# Patient Record
Sex: MALE | Race: WHITE | Employment: UNEMPLOYED | URBAN - METROPOLITAN AREA
[De-identification: names, ages, dates, MRNs, and addresses within clinical notes are randomized per-mention and may not be internally consistent; named-entity substitution may affect disease eponyms.]

---

## 2022-06-20 ENCOUNTER — APPOINTMENT (OUTPATIENT)
Dept: GENERAL RADIOLOGY | Age: 77
End: 2022-06-20
Payer: MEDICARE

## 2022-06-20 ENCOUNTER — HOSPITAL ENCOUNTER (EMERGENCY)
Age: 77
Discharge: HOME | End: 2022-06-20
Attending: EMERGENCY MEDICINE
Payer: MEDICARE

## 2022-06-20 VITALS
HEIGHT: 76 IN | SYSTOLIC BLOOD PRESSURE: 127 MMHG | WEIGHT: 239.9 LBS | TEMPERATURE: 97.9 F | OXYGEN SATURATION: 100 % | RESPIRATION RATE: 13 BRPM | DIASTOLIC BLOOD PRESSURE: 80 MMHG | HEART RATE: 99 BPM | BODY MASS INDEX: 29.21 KG/M2

## 2022-06-20 DIAGNOSIS — I48.92 NEW ONSET ATRIAL FLUTTER (HCC): ICD-10-CM

## 2022-06-20 DIAGNOSIS — I48.92 ATRIAL FLUTTER WITH RAPID VENTRICULAR RESPONSE (HCC): Primary | ICD-10-CM

## 2022-06-20 LAB
A/G RATIO: 1.6 (ref 1.1–2.2)
ALBUMIN SERPL-MCNC: 4.2 G/DL (ref 3.4–5)
ALP BLD-CCNC: 73 U/L (ref 40–129)
ALT SERPL-CCNC: 11 U/L (ref 10–40)
ANION GAP SERPL CALCULATED.3IONS-SCNC: 14 MMOL/L (ref 3–16)
AST SERPL-CCNC: 17 U/L (ref 15–37)
BASOPHILS ABSOLUTE: 0.1 K/UL (ref 0–0.2)
BASOPHILS RELATIVE PERCENT: 1.1 %
BILIRUB SERPL-MCNC: 0.6 MG/DL (ref 0–1)
BUN BLDV-MCNC: 12 MG/DL (ref 7–20)
CALCIUM SERPL-MCNC: 9.3 MG/DL (ref 8.3–10.6)
CHLORIDE BLD-SCNC: 109 MMOL/L (ref 99–110)
CO2: 19 MMOL/L (ref 21–32)
CREAT SERPL-MCNC: 1.1 MG/DL (ref 0.8–1.3)
EKG ATRIAL RATE: 267 BPM
EKG DIAGNOSIS: NORMAL
EKG P AXIS: 247 DEGREES
EKG Q-T INTERVAL: 280 MS
EKG QRS DURATION: 92 MS
EKG QTC CALCULATION (BAZETT): 380 MS
EKG R AXIS: -72 DEGREES
EKG T AXIS: 63 DEGREES
EKG VENTRICULAR RATE: 111 BPM
EOSINOPHILS ABSOLUTE: 0.1 K/UL (ref 0–0.6)
EOSINOPHILS RELATIVE PERCENT: 1.3 %
GFR AFRICAN AMERICAN: >60
GFR NON-AFRICAN AMERICAN: >60
GLUCOSE BLD-MCNC: 115 MG/DL (ref 70–99)
HCT VFR BLD CALC: 47 % (ref 40.5–52.5)
HEMOGLOBIN: 16.2 G/DL (ref 13.5–17.5)
LYMPHOCYTES ABSOLUTE: 1.7 K/UL (ref 1–5.1)
LYMPHOCYTES RELATIVE PERCENT: 26.9 %
MCH RBC QN AUTO: 32.9 PG (ref 26–34)
MCHC RBC AUTO-ENTMCNC: 34.4 G/DL (ref 31–36)
MCV RBC AUTO: 95.6 FL (ref 80–100)
MONOCYTES ABSOLUTE: 0.5 K/UL (ref 0–1.3)
MONOCYTES RELATIVE PERCENT: 8.6 %
NEUTROPHILS ABSOLUTE: 3.9 K/UL (ref 1.7–7.7)
NEUTROPHILS RELATIVE PERCENT: 62.1 %
PDW BLD-RTO: 13 % (ref 12.4–15.4)
PLATELET # BLD: 226 K/UL (ref 135–450)
PMV BLD AUTO: 8.9 FL (ref 5–10.5)
POTASSIUM REFLEX MAGNESIUM: 4 MMOL/L (ref 3.5–5.1)
PRO-BNP: 933 PG/ML (ref 0–449)
RBC # BLD: 4.91 M/UL (ref 4.2–5.9)
SODIUM BLD-SCNC: 142 MMOL/L (ref 136–145)
TOTAL PROTEIN: 6.8 G/DL (ref 6.4–8.2)
TROPONIN: <0.01 NG/ML
TSH REFLEX: 2.58 UIU/ML (ref 0.27–4.2)
WBC # BLD: 6.3 K/UL (ref 4–11)

## 2022-06-20 PROCEDURE — 96374 THER/PROPH/DIAG INJ IV PUSH: CPT

## 2022-06-20 PROCEDURE — 84484 ASSAY OF TROPONIN QUANT: CPT

## 2022-06-20 PROCEDURE — 80053 COMPREHEN METABOLIC PANEL: CPT

## 2022-06-20 PROCEDURE — 6370000000 HC RX 637 (ALT 250 FOR IP): Performed by: PHYSICIAN ASSISTANT

## 2022-06-20 PROCEDURE — 96361 HYDRATE IV INFUSION ADD-ON: CPT

## 2022-06-20 PROCEDURE — 2500000003 HC RX 250 WO HCPCS: Performed by: PHYSICIAN ASSISTANT

## 2022-06-20 PROCEDURE — 93010 ELECTROCARDIOGRAM REPORT: CPT | Performed by: INTERNAL MEDICINE

## 2022-06-20 PROCEDURE — 83880 ASSAY OF NATRIURETIC PEPTIDE: CPT

## 2022-06-20 PROCEDURE — 71045 X-RAY EXAM CHEST 1 VIEW: CPT

## 2022-06-20 PROCEDURE — 84443 ASSAY THYROID STIM HORMONE: CPT

## 2022-06-20 PROCEDURE — 93005 ELECTROCARDIOGRAM TRACING: CPT | Performed by: EMERGENCY MEDICINE

## 2022-06-20 PROCEDURE — 85025 COMPLETE CBC W/AUTO DIFF WBC: CPT

## 2022-06-20 PROCEDURE — 2580000003 HC RX 258: Performed by: PHYSICIAN ASSISTANT

## 2022-06-20 PROCEDURE — 93005 ELECTROCARDIOGRAM TRACING: CPT | Performed by: PHYSICIAN ASSISTANT

## 2022-06-20 PROCEDURE — 99285 EMERGENCY DEPT VISIT HI MDM: CPT

## 2022-06-20 RX ORDER — METOPROLOL TARTRATE 5 MG/5ML
5 INJECTION INTRAVENOUS
Status: COMPLETED | OUTPATIENT
Start: 2022-06-20 | End: 2022-06-20

## 2022-06-20 RX ORDER — 0.9 % SODIUM CHLORIDE 0.9 %
500 INTRAVENOUS SOLUTION INTRAVENOUS ONCE
Status: COMPLETED | OUTPATIENT
Start: 2022-06-20 | End: 2022-06-20

## 2022-06-20 RX ADMIN — METOPROLOL TARTRATE 5 MG: 5 INJECTION, SOLUTION INTRAVENOUS at 11:41

## 2022-06-20 RX ADMIN — METOPROLOL TARTRATE 5 MG: 5 INJECTION, SOLUTION INTRAVENOUS at 11:33

## 2022-06-20 RX ADMIN — METOPROLOL TARTRATE 5 MG: 5 INJECTION, SOLUTION INTRAVENOUS at 11:45

## 2022-06-20 RX ADMIN — SODIUM CHLORIDE 500 ML: 9 INJECTION, SOLUTION INTRAVENOUS at 10:54

## 2022-06-20 RX ADMIN — METOPROLOL TARTRATE 25 MG: 25 TABLET, FILM COATED ORAL at 13:00

## 2022-06-20 ASSESSMENT — PAIN - FUNCTIONAL ASSESSMENT
PAIN_FUNCTIONAL_ASSESSMENT: ACTIVITIES ARE NOT PREVENTED
PAIN_FUNCTIONAL_ASSESSMENT: 0-10
PAIN_FUNCTIONAL_ASSESSMENT: NONE - DENIES PAIN
PAIN_FUNCTIONAL_ASSESSMENT: ACTIVITIES ARE NOT PREVENTED

## 2022-06-20 ASSESSMENT — PAIN DESCRIPTION - LOCATION: LOCATION: GENERALIZED

## 2022-06-20 ASSESSMENT — PAIN DESCRIPTION - DESCRIPTORS: DESCRIPTORS: ACHING

## 2022-06-20 ASSESSMENT — PAIN DESCRIPTION - FREQUENCY: FREQUENCY: CONTINUOUS

## 2022-06-20 ASSESSMENT — PAIN SCALES - GENERAL: PAINLEVEL_OUTOF10: 2

## 2022-06-20 ASSESSMENT — PAIN DESCRIPTION - ONSET: ONSET: ON-GOING

## 2022-06-20 NOTE — ED PROVIDER NOTES
Date of evaluation: 6/20/2022    ED Attending Attestation Note     CHIEF COMPLAINT     This morning I got up to go to the bathroom and shower and when I got out my body locked up and my heart felt like it was beating really fast and I was able to crawl to my bed and by the time my son got there I was feeling a lot better   HISTORY OF PRESENT ILLNESS  (Location/Symptom, Timing/Onset,Context/Setting, Quality, Duration, Modifying Factors, Severity). Note limiting factors. This patient was seen by the advance practice provider. I have seen and examined the patient, agree with the workup, evaluation, management and diagnosis. The care plan has been discussed. Chief Complaint   Patient presents with    Loss of Consciousness     pre syncopal episode this am while in the bathroom. pt states he had body aches last night.  body aches this am.  pt wtih chest pain one hr ago. denies diaphoresis denies nausea        Donaldo Aguila is a 69 y.o. male who presents to the emergency department secondary to concern for feeling his body locked up earlier today associated with palpitations. He did not actually lose consciousness and stated he did not exactly feel like he was going to pass out but rather that he could not move. He stated after laying down in bed he felt significantly better and he is still feeling significantly better. He has no known history of cardiac arrhythmia or cardiac issue. He is not on blood thinners. He is not on any antihypertensive medication. He follows with his primary care twice a year. He denies any chest pain, trouble breathing, nausea, vomiting. He does have chronic pain from back issues though reports he has weaned himself off of tramadol and is down from 6 pills of gabapentin to 2 pills daily for history of neuropathy. No recent illness like symptoms. Past medical history noted below:    has a past medical history of Arthritis, Cancer ([address] and Kidney stones.    Social History Socioeconomic History    Marital status:      Spouse name: Not on file    Number of children: Not on file    Years of education: Not on file    Highest education level: Not on file   Occupational History    Not on file   Tobacco Use    Smoking status: Never Smoker    Smokeless tobacco: Not on file   Substance and Sexual Activity    Alcohol use: Yes     Comment: couple beers a day    Drug use: Not on file    Sexual activity: Not on file   Other Topics Concern    Not on file   Social History Narrative    Not on file     Social Determinants of Health     Financial Resource Strain:     Difficulty of Paying Living Expenses: Not on file   Food Insecurity:     Worried About Running Out of Food in the Last Year: Not on file    Marcia of Food in the Last Year: Not on file   Transportation Needs:     Lack of Transportation (Medical): Not on file    Lack of Transportation (Non-Medical): Not on file   Physical Activity:     Days of Exercise per Week: Not on file    Minutes of Exercise per Session: Not on file   Stress:     Feeling of Stress : Not on file   Social Connections:     Frequency of Communication with Friends and Family: Not on file    Frequency of Social Gatherings with Friends and Family: Not on file    Attends Mosque Services: Not on file    Active Member of [address] [place] [address] [employer] or Organizations: Not on file    Attends Club or Organization Meetings: Not on file    Marital Status: Not on file   Intimate Partner Violence:     Fear of Current or Ex-Partner: Not on file    Emotionally Abused: Not on file    Physically Abused: Not on file    Sexually Abused: Not on file   Housing Stability:     Unable to Pay for Housing in the Last Year: Not on file    Number of [place] in the Last Year: Not on file    Unstable Housing in the Last Year: Not on file     Aside from what is stated above denies any other symptoms or modifying factors. Nursing Notes reviewed.     Past Surgical History: Procedure Laterality Date    BACK SURGERY      COLONOSCOPY  6-16-16    Dr Colette Ramirez       No family history on file. CURRENT MEDICATIONS       Previous Medications    ASPIRIN 81 MG TABLET    Take 81 mg by mouth daily    CETIRIZINE (ZYRTEC) 10 MG TABLET    Take 10 mg by mouth daily    MELOXICAM (MOBIC) 15 MG TABLET    Take 15 mg by mouth daily    TRAMADOL (ULTRAM) 50 MG TABLET    Take 50 mg by mouth every 6 hours as needed for Pain      DIAGNOSTIC RESULTS   EKG: interpreted by the Emergency Department Physician who either signs or Co-signs this chart in the absence of a cardiologist.  EKG Indication: Tachycardia  EKG Interpretation: Rate tachycardic 111, rhythm appears most consistent with atrial flutter, axis left. QRS/QTc within normal limits. No prior EKG is available for comparison. RADIOLOGY:   Interpretation per Radiologist below, if available at the time of this note:  XR CHEST PORTABLE   Final Result   No radiographic evidence of acute pulmonary disease.            Patient was given the following medications:  Orders Placed This Encounter   Medications    0.9 % sodium chloride bolus    metoprolol (LOPRESSOR) injection 5 mg    metoprolol tartrate (LOPRESSOR) tablet 25 mg    apixaban (ELIQUIS) 5 mg tablets (ED 8 day apixaban AFIB pack)     Order Specific Question:   Indication of Use     Answer:   A Fib/A Flutter    metoprolol tartrate (LOPRESSOR) 25 MG tablet     Sig: Take 1 tablet by mouth 2 times daily     Dispense:  60 tablet     Refill:  0    apixaban (ELIQUIS) 5 MG TABS tablet     Sig: Take 1 tablet by mouth 2 times daily     Dispense:  60 tablet     Refill:  0       INITIAL VITALS: BP: (!) 164/93, Temp: 97.9 °F (36.6 °C), Heart Rate: (!) 110, Resp: 18, SpO2: 98 %   RECENT VITALS:  BP: 127/80,Temp: 97.9 °F (36.6 °C), Heart Rate: 99, Resp: 13, SpO2: 100 %     I personally saw the patient and performed a substantive portion of the visit including all aspects of the medical decision making. Is this patient to be included in the SEP-1 Core Measure due to severe sepsis or septic shock? No   Exclusion criteria - the patient is NOT to be included for SEP-1 Core Measure due to:  2+ SIRS criteria are not met      My assessment reveals a very pleasant male laying in bed with his wife at the bedside. She did trade out with their son who is an internist at [facility] [facility] [employer].  On my assessment his lungs are clear, no increased work of breathing, abdomen benign, no peripheral edema. He answers questions appropriately and in complete sentences. We went over the potential etiologies of cardiac arrhythmias and plan for work-up here. In the interim he also had ordered metoprolol IV 5 mg every 5 minutes x3 doses for heart rate greater than 110. CHADS-VASC 2. On reassessment his heart rate had improved. We discussed his elevated [id number] and recommendation for starting anticoagulation. Also discussed starting p.o. metoprolol, received first dose here along with first dose and take home package) of eliquis. Also discussed our new protocols here with urgent follow-up outpatient. He is feeling significantly better at that time. Was set up for discharge, just prior to discharge his heart rate jumped back up to the 130s and a repeat EKG was done which came back under 110 without any other changes. On reassessment he continued to feel well. He had gotten up and use the restroom had no new symptoms. Both he and his son at the bedside were in agreement still for discharge with plan for outpatient follow-up. They expressed understanding of return precautions. They expressed understanding of instructions, were in agreement with plan, and he was discharged home with his son in stable condition. FINAL IMPRESSION      1. Atrial flutter with rapid ventricular response ([facility] [address]    2.  New onset atrial flutter [facility])        DISPOSITION/PLAN   DISPOSITION Decision To Discharge 06/20/2022 01:31:32 PM      PATIENT REFERRED TO:  [facility] Emergency Department  [address] [facility]  [phone]  Go to   If symptoms worsen    [facility] HEART INST [address] [place]  Call   For follow up appointment    [address] [place]  Call   For follow up and evaluation as oupatient this week.       DISCHARGE MEDICATIONS:  New Prescriptions    APIXABAN (ELIQUIS) 5 MG TABS TABLET    Take 1 tablet by mouth 2 times daily    METOPROLOL TARTRATE (LOPRESSOR) 25 MG TABLET    Take 1 tablet by mouth 2 times daily            (Please note that portions of this note were completed with a voice recognition program. Efforts were made to edit the dictations but occasionally words are mis-transcribed.)    Marian Hester MD (electronically signed)  Attending Emergency Physician        Marian Hester MD  06/20/22 4212

## 2022-06-20 NOTE — ED PROVIDER NOTES
[address]  [address] [address]  Dept: [phone]  Loc: [phone]  eMERGENCYdEPARTMENT eNCOUnter      Pt Name: Jorge Alberto Estrada  MRN: [id number]  [place] 1945  Date of evaluation: 6/20/2022  Provider:Connie Wolf PA-C    CHIEF COMPLAINT       Chief Complaint   Patient presents with    Loss of Consciousness     pre syncopal episode this am while in the bathroom. pt states he had body aches last night.  body aches this am.  pt wtih chest pain one hr ago. denies diaphoresis denies nausea       CRITICAL CARE TIME   Total Critical Care time was 15 minutes, excluding separately reportable procedures. There was a high probability of clinically significant/life threatening deterioration in the patient's condition which required my urgentintervention. HISTORY OF PRESENT ILLNESS  (Location/Symptom, Timing/Onset, Context/Setting, Quality, Duration,Modifying Factors, Severity.)   Jorge Alberto Estrada is a 69 y.o. male with a PMH of HDL, alzheimers dx, RLS, BPH who presents to the emergency department by private vehicle following a near syncopal episode. Patient was walking out of the bathroom this am and began feeling lightheaded. He states his body \"locked up\" and then he almost lost consciousness. Episode lasted about 30 seconds. He states he did feel as if his heart was pounding in his chest at that time. He denies any associated sob, nausea, vomiting, diaphoresis, headache, chest pain, vertigo sx. States he has had occasional episodes when he get light headed upon standing over the past year. These episodes are brief and rare. Never has had episode like that he experienced this am.  Patient's son who is an internist at outside hospital and lives nearby was called over. He noted his father's heart rate was elevated and appeared irregular. Patient with no history of A. fib.  Given this and symptoms he brought him to ED for further evaluation. Nursing Notes were reviewedand agreed with or any disagreements were addressed in the HPI. REVIEW OF SYSTEMS    (2-9 systems for level 4, 10 or more for level 5)     Review of Systems   Constitutional: Negative. HENT: Negative. Eyes: Negative. Respiratory: Negative for chest tightness and shortness of breath. Cardiovascular: Negative for chest pain. Gastrointestinal: Negative for abdominal pain, nausea and vomiting. Genitourinary: Negative. Musculoskeletal: Negative for arthralgias and myalgias. Skin: Negative. Neurological: Positive for light-headedness. Psychiatric/Behavioral: Negative for behavioral problems and confusion. Except as noted above the remainder of the review of systems was reviewed and negative. PAST MEDICAL HISTORY         Diagnosis Date    Arthritis     Cancer ([facility] [address]     basal and squamous cell on back    Kidney stones        SURGICAL HISTORY           Procedure Laterality Date    BACK SURGERY      COLONOSCOPY  6-16-16    Dr Alejandra Lugo     [unfilled]    ALLERGIES     Patient has no known allergies. FAMILY HISTORY     No family history on file. No family status information on file. SOCIAL HISTORY      reports that he has never smoked. He does not have any smokeless tobacco history on file. He reports current alcohol use. PHYSICAL EXAM    (up to 7 for level 4, 8 or more for level 5)     ED Triage Vitals   Enc Vitals Group      BP 06/20/22 1004 (!) 164/93      Heart Rate 06/20/22 1004 (!) 110      Resp 06/20/22 1004 18      Temp 06/20/22 1004 97.9 °F (36.6 °C)      Temp Source 06/20/22 1004 Tympanic      SpO2 --       Weight 06/20/22 1004 239 lb 14.4 oz (108.8 kg)      Height 06/20/22 1024 6' 4\" (1.93 m)      Head Circumference --       Peak Flow --       Pain Score --       Pain Loc --       Pain Edu? --       Excl.  in [address]? --         Physical Exam  Constitutional:       Appearance: Normal appearance. HENT:      Head: Normocephalic and atraumatic. Cardiovascular:      Rate and Rhythm: Tachycardia present. Rhythm irregular. Pulmonary:      Effort: Pulmonary effort is normal. No respiratory distress. Breath sounds: Normal breath sounds. Abdominal:      Palpations: Abdomen is soft. Tenderness: There is no abdominal tenderness. Musculoskeletal:         General: Normal range of motion. Cervical back: Normal range of motion and neck supple. Skin:     General: Skin is warm. Neurological:      General: No focal deficit present. Mental Status: He is alert and oriented to person, place, and time. Comments: No ataxia, negative test of skew   Psychiatric:         Mood and Affect: Mood normal.         Behavior: Behavior normal.           DIAGNOSTIC RESULTS     EKG: All EKG's are interpreted by the Emergency Department Physician who either signs or Co-signs this chart in the absence of a cardiologist.    RADIOLOGY:   Non-plain film images such as CT, Ultrasound and MRI are read by the radiologist. Plain radiographic images are visualized and preliminarilyinterpreted by the emergency physician with the below findings:    Interpretation per the Radiologist below,if available at the time of this note:    XR CHEST PORTABLE   Final Result   No radiographic evidence of acute pulmonary disease.                LABS:  Labs Reviewed   COMPREHENSIVE METABOLIC PANEL W/ REFLEX TO MG FOR LOW K - Abnormal; Notable for the following components:       Result Value    CO2 19 (*)     Glucose 115 (*)     All other components within normal limits   BRAIN NATRIURETIC PEPTIDE - Abnormal; Notable for the following components:    Pro- (*)     All other components within normal limits   CBC WITH AUTO DIFFERENTIAL   TROPONIN   TSH WITH REFLEX   URINALYSIS WITH REFLEX TO CULTURE       All other labs were within normal range or not returned as of this dictation. EMERGENCY DEPARTMENT COURSE and DIFFERENTIAL DIAGNOSIS/MDM:   Vitals:    Vitals:    06/20/22 1300 06/20/22 1330 06/20/22 1400 06/20/22 1412   BP: 111/71 (!) 116/95 127/80    Pulse: 81 (!) 122 97 99   Resp: 13 10 13    Temp:       TempSrc:       SpO2: 96% 100% 100%    Weight:       Height:           MDM     Patient presents ED with HPI noted above. Patient found to have new onset a flutter with RVR. Patient with no history of A. fib/a flutter. Patient with no signs/symptoms concerning for sepsis or infectious process. Heart rate is 110s to 130s on arrival.  He was given 5 mg IV metoprolol every 5 minutes x3 with improvement of heart rate. Heart rate controlled in the 70s and 90s. He was given 25 mg p.o. metoprolol. Just after medications given heart rate did jump back up to the 130s however improved as oral medication took effect. Patient with CHADS-VAS score of 2, anticoagulation initiated. Heart rate increase just prior to discharge to the 120s. Patient was able to get up and walk around at this time. He was asymptomatic and voiced he felt well. Without any further intervention heart rate improved again to the 90s and he was discharged home. Patient and son comfortable plan. Urgent outpatient referral to EP clinic provided. He was discharged home in stable condition. The patient tolerated their visit well. They were seen and evaluated by the attending physician, Dr. Harlan Mckeon who agreed with the assessment and plan. The patient and / or the family were informed of the results of any tests, a time was given to answer questions, a plan was proposed and they agreed with plan.         [id number] Score for Atrial Fibrillation Stroke Risk   Risk   Factors  Component Value   C CHF No 0   H HTN No 0   A2 Age >= 76 Yes,  (78 y.o.) 2   D DM No 0   S2 Prior Stroke/TIA No 0   V Vascular Disease No 0   A Age 74-69 No,  (78 y.o.) 0   Sc Sex male 0    [id number]  Score  2   Score last updated 6/21/22 9:96 AM EDT    Click here for a link to the UpToDate guideline \"Atrial Fibrillation: Anticoagulation therapy to prevent embolization    Disclaimer: Risk Score calculation is dependent on accuracy of patient problem list and past encounter diagnosis. CONSULTS:  None    PROCEDURES:  Procedures    FINAL IMPRESSION      1. Atrial flutter with rapid ventricular response ([address]    2. New onset atrial flutter [facility])          DISPOSITION/PLAN   [unfilled]    PATIENT REFERRED TO:  [facility] Emergency Department  [address] [facility]  [phone]  Go to   If symptoms worsen    [facility] HEART INST [address] [place] [place]  Call   For follow up appointment    [address] [place] [facility]  Call   For follow up and evaluation as oupatient this week.       DISCHARGE MEDICATIONS:  Discharge Medication List as of 6/20/2022  2:55 PM      START taking these medications    Details   metoprolol tartrate (LOPRESSOR) 25 MG tablet Take 1 tablet by mouth 2 times daily, Disp-60 tablet, R-0Normal             (Please note that portions of this note were completed with a voice recognition program.  Efforts were made to edit the dictations but occasionally words are mis-transcribed.)    [address] [facility]          [address] [place], [place]  06/21/22 2105

## 2022-06-21 LAB
EKG DIAGNOSIS: NORMAL
EKG Q-T INTERVAL: 338 MS
EKG QRS DURATION: 104 MS
EKG QTC CALCULATION (BAZETT): 438 MS
EKG R AXIS: -59 DEGREES
EKG T AXIS: 13 DEGREES
EKG VENTRICULAR RATE: 101 BPM

## 2022-06-21 PROCEDURE — 93010 ELECTROCARDIOGRAM REPORT: CPT | Performed by: INTERNAL MEDICINE

## 2022-06-21 ASSESSMENT — ENCOUNTER SYMPTOMS
VOMITING: 0
CHEST TIGHTNESS: 0
ABDOMINAL PAIN: 0
EYES NEGATIVE: 1
NAUSEA: 0
SHORTNESS OF BREATH: 0